# Patient Record
Sex: FEMALE | Race: WHITE | NOT HISPANIC OR LATINO | Employment: FULL TIME | URBAN - METROPOLITAN AREA
[De-identification: names, ages, dates, MRNs, and addresses within clinical notes are randomized per-mention and may not be internally consistent; named-entity substitution may affect disease eponyms.]

---

## 2017-09-05 ENCOUNTER — NON-PROVIDER VISIT (OUTPATIENT)
Dept: URGENT CARE | Facility: PHYSICIAN GROUP | Age: 57
End: 2017-09-05

## 2017-09-05 DIAGNOSIS — Z02.1 PRE-EMPLOYMENT DRUG SCREENING: ICD-10-CM

## 2017-09-05 LAB
AMP AMPHETAMINE: NORMAL
COC COCAINE: NORMAL
INT CON NEG: NEGATIVE
INT CON POS: POSITIVE
MET METHAMPHETAMINES: NORMAL
OPI OPIATES: NORMAL
PCP PHENCYCLIDINE: NORMAL
POC DRUG COMMENT 753798-OCCUPATIONAL HEALTH: NORMAL
THC: NORMAL

## 2017-09-05 PROCEDURE — 80305 DRUG TEST PRSMV DIR OPT OBS: CPT | Performed by: PHYSICIAN ASSISTANT

## 2017-11-03 ENCOUNTER — OFFICE VISIT (OUTPATIENT)
Dept: MEDICAL GROUP | Facility: MEDICAL CENTER | Age: 57
End: 2017-11-03
Payer: COMMERCIAL

## 2017-11-03 ENCOUNTER — HOSPITAL ENCOUNTER (OUTPATIENT)
Dept: RADIOLOGY | Facility: MEDICAL CENTER | Age: 57
End: 2017-11-03
Attending: INTERNAL MEDICINE
Payer: COMMERCIAL

## 2017-11-03 VITALS
SYSTOLIC BLOOD PRESSURE: 140 MMHG | HEART RATE: 76 BPM | OXYGEN SATURATION: 97 % | HEIGHT: 67 IN | WEIGHT: 184 LBS | DIASTOLIC BLOOD PRESSURE: 90 MMHG | BODY MASS INDEX: 28.88 KG/M2 | TEMPERATURE: 98.6 F

## 2017-11-03 DIAGNOSIS — Z12.11 SCREENING FOR MALIGNANT NEOPLASM OF COLON: ICD-10-CM

## 2017-11-03 DIAGNOSIS — Z12.39 SCREENING FOR MALIGNANT NEOPLASM OF BREAST: ICD-10-CM

## 2017-11-03 DIAGNOSIS — M79.642 LEFT HAND PAIN: ICD-10-CM

## 2017-11-03 DIAGNOSIS — Z23 NEED FOR DIPHTHERIA-TETANUS-PERTUSSIS (TDAP) VACCINE: ICD-10-CM

## 2017-11-03 DIAGNOSIS — S69.92XA HAND INJURY, LEFT, INITIAL ENCOUNTER: ICD-10-CM

## 2017-11-03 DIAGNOSIS — E03.9 ACQUIRED HYPOTHYROIDISM: ICD-10-CM

## 2017-11-03 DIAGNOSIS — G43.809 OTHER MIGRAINE WITHOUT STATUS MIGRAINOSUS, NOT INTRACTABLE: ICD-10-CM

## 2017-11-03 DIAGNOSIS — Z76.89 ENCOUNTER TO ESTABLISH CARE: ICD-10-CM

## 2017-11-03 DIAGNOSIS — Z00.00 HEALTH CARE MAINTENANCE: ICD-10-CM

## 2017-11-03 DIAGNOSIS — B02.9 HERPES ZOSTER WITHOUT COMPLICATION: ICD-10-CM

## 2017-11-03 PROBLEM — G43.909 MIGRAINE: Status: ACTIVE | Noted: 2017-11-03

## 2017-11-03 PROCEDURE — 99204 OFFICE O/P NEW MOD 45 MIN: CPT | Mod: 25 | Performed by: INTERNAL MEDICINE

## 2017-11-03 PROCEDURE — 73120 X-RAY EXAM OF HAND: CPT | Mod: LT

## 2017-11-03 PROCEDURE — 90715 TDAP VACCINE 7 YRS/> IM: CPT | Performed by: INTERNAL MEDICINE

## 2017-11-03 PROCEDURE — 90471 IMMUNIZATION ADMIN: CPT | Performed by: INTERNAL MEDICINE

## 2017-11-03 RX ORDER — LEVOTHYROXINE SODIUM 0.1 MG/1
100 TABLET ORAL
Qty: 90 TAB | Refills: 0 | Status: SHIPPED | OUTPATIENT
Start: 2017-11-03 | End: 2018-02-02 | Stop reason: SDUPTHER

## 2017-11-03 RX ORDER — RIZATRIPTAN BENZOATE 10 MG/1
10 TABLET ORAL
Qty: 10 TAB | Refills: 3 | Status: SHIPPED | OUTPATIENT
Start: 2017-11-03 | End: 2018-05-17 | Stop reason: SDUPTHER

## 2017-11-03 RX ORDER — VALACYCLOVIR HYDROCHLORIDE 1 G/1
1000 TABLET, FILM COATED ORAL 2 TIMES DAILY
Qty: 20 TAB | Refills: 0 | Status: SHIPPED | OUTPATIENT
Start: 2017-11-03 | End: 2018-08-15 | Stop reason: SDUPTHER

## 2017-11-03 RX ORDER — LEVOTHYROXINE SODIUM 0.1 MG/1
100 TABLET ORAL
COMMUNITY
End: 2017-11-03 | Stop reason: SDUPTHER

## 2017-11-03 ASSESSMENT — PATIENT HEALTH QUESTIONNAIRE - PHQ9: CLINICAL INTERPRETATION OF PHQ2 SCORE: 0

## 2017-11-03 NOTE — LETTER
[facility]  Elke Banks M.D.  [address]  [place] [address]  Fax: [phone]   Authorization for Release/Disclosure of   Protected Health Information   Name: MYRTLE LIMA : 1960 SSN: [id number]   Address: [address] [place] [address] Phone:    [phone] (home)    I authorize the entity listed below to release/disclose the PHI below to:   [facility]/Elke Banks M.D. and Elke Banks M.D.   Provider or Entity Name:     Address   City, State, Zip   Phone:      Fax:     Reason for request: continuity of care   Information to be released:    [  ] LAST COLONOSCOPY,  including any PATH REPORT and follow-up  [  ] LAST FIT/COLOGUARD RESULT [  ] LAST DEXA  [  ] LAST MAMMOGRAM  [  ] LAST PAP  [  ] LAST LABS [  ] RETINA EXAM REPORT  [  ] IMMUNIZATION RECORDS  [  ] Release all info      [  ] Check here and initial the line next to each item to release ALL health information INCLUDING  _____ Care and treatment for drug and / or alcohol abuse  _____ HIV testing, infection status, or AIDS  _____ Genetic Testing    DATES OF SERVICE OR TIME PERIOD TO BE DISCLOSED: _____________  I understand and acknowledge that:  * This Authorization may be revoked at any time by you in writing, except if your health information has already been used or disclosed.  * Your health information that will be used or disclosed as a result of you signing this authorization could be re-disclosed by the recipient. If this occurs, your re-disclosed health information may no longer be protected by State or Federal laws.  * You may refuse to sign this Authorization. Your refusal will not affect your ability to obtain treatment.  * This Authorization becomes effective upon signing and will  on (date) __________.      If no date is indicated, this Authorization will  one (1) year from the signature date.    Name: Myrtle Lima    Signature:   Date:     11/3/2017            PLEASE FAX REQUESTED RECORDS BACK TO: [phone]

## 2017-11-03 NOTE — PROGRESS NOTES
CHIEF COMPLAINT  Chief Complaint   Patient presents with   • Establish Care     smashed hand, orders for colonoscopy, meds      HPI  Patient is a 57 y.o. female patient who presents today for the following     HYPOTHYROIDISM  Levothyroxine, 100 mcg, taking daily early in am, before any po intake.  No temperature intolerance. No change in weight,  hair/skin quality, BMs.   No tremors, weakness.  No peripheral swelling.  No mood changes.  FH: neg    MIGRAINE  Onset: since early 20'  The patient reports  left temporal headaches, described as: severe in severity , sharp, pounding with nausea, emesis, photophobia or aura, nausea and photophobia, without radiation.  Usual frequency is every 2 weeks for the last 2-3 months  Aura is not present.  Headaches are relieved by rizatriptan  Known triggers include: stress, poor sleep.   Previous treatment and prevention include: as above.    Pertinent negatives:  Denies difficulty with speech or swallowing, facial numbness or tingling, focal weakness. Denies sore throat or cough, fever, sinus congestion, ear pain, tooth clenching or grinding.    Family Hx: no known family members with significant headaches  Prior imaging: no    H/o shingles  She has h/o shingles at least 10 yrs ago.   C/o intermittent flares, with skin burning,    - started few days ago, mild, on the right lateral chest.   She requested Valtrex refill, that use in the past.     Left hand injury/pain  Onset: 10 days ago  Trauma: Heavy metal window dropped on her hand  Complains of:  - Pain in the right hand  - 2 cm palm laceration  - Change of sensation in the last 2 medial fingers  - Pain in the left elbow and shoulder    Denies:  Fever, chills  Numbness, weakness, tingling    Reviewed PMH, PSH, FH, SH, ALL, HCM/IMM  Reviewed MEDS    CURRENT MEDICATIONS  Current Outpatient Prescriptions   Medication Sig Dispense Refill   • levothyroxine (SYNTHROID) 100 MCG Tab Take 100 mcg by mouth Every morning on an empty  "stomach.       No current facility-administered medications for this visit.      ALLERGIES  Allergies: Penicillins  PAST MEDICAL HISTORY  Past Medical History:   Diagnosis Date   • Hypothyroidism      SURGICAL HISTORY  She  has no past surgical history on file.  SOCIAL HISTORY  Social History   Substance Use Topics   • Smoking status: Never Smoker   • Smokeless tobacco: Never Used   • Alcohol use 3.0 oz/week     5 Glasses of wine per week     Social History     Social History Narrative   • No narrative on file     FAMILY HISTORY  History reviewed. No pertinent family history.  No family status information on file.     ROS   Constitutional: Negative for fever, chills.  HENT: Negative for congestion, sore throat.  Eyes: Negative for blurred vision.   Respiratory: Negative for cough, shortness of breath.  Cardiovascular: Negative for chest pain, palpitations.   Gastrointestinal: Negative for heartburn, nausea, abdominal pain.   Genitourinary: Negative for dysuria.  Musculoskeletal: as above  Skin: Negative for rash and itching.   Neuro: Negative for dizziness, weakness and as above.   Endo/Heme/Allergies: Does not bruise/bleed easily. And per HPI.  Psychiatric/Behavioral: Negative for depression, anxiety    PHYSICAL EXAM   Blood pressure 140/90, pulse 76, temperature 37 °C (98.6 °F), height 1.702 m (5' 7\"), weight 83.5 kg (184 lb), SpO2 97 %, not currently breastfeeding. Body mass index is 28.82 kg/m².  General:  NAD, well appearing  HEENT:   NC/AT, PERRLA, EOMI, TMs are clear. Oropharyngeal mucosa is pink,  without lesions;  no cervical / supraclavicular  lymphadenopathy, no thyromegaly.    Cardiovascular: RRR.   No m/r/g. No carotid bruits .      Lungs:   CTAB, no w/r/r, no respiratory distress.  Abdomen: Soft, NT/ND + BS; no suprapubic tenderness; no masses or hepatosplenomegaly.  Extremities:  2+ DP and radial pulses bilaterally.  No c/c/e.   Skin:  Warm, dry.  No erythema. No rash.   Neurologic: Alert & oriented x " 3. CN II-XII grossly intact.   Left hand:   Decreased sensation over the 4th/5th fingers on the left side, 2 cm palmar laceration.  TTP over the hand, no significant swelling.   Psychiatric:  Affect normal, mood normal, judgment normal.    LABS     Penidng    IMAGING     None    ASSESMENT AND PLAN        1. Other migraine without status migrainosus, not intractable  Controlled, continue current treatment, avoids triggers  - rizatriptan (MAXALT) 10 MG tablet; Take 1 Tab by mouth Once PRN for Migraine for up to 1 dose.  Dispense: 10 Tab; Refill: 3    2. Acquired hypothyroidism  Asymptomatic, continue current treatment, pending TSH  - TSH; Future  - levothyroxine (SYNTHROID) 100 MCG Tab; Take 1 Tab by mouth Every morning on an empty stomach.  Dispense: 90 Tab; Refill: 0    3. Herpes zoster without complication  Refill:  - valacyclovir (VALTREX) 1 GM Tab; Take 1 Tab by mouth 2 times a day.  Dispense: 20 Tab; Refill: 0    4. Hand injury, left, initial encounter  - DX-HAND 2- LEFT; Future  5. Left hand pain  - DX-HAND 2- LEFT; Future  Advice:  RICE  Brace/sling    6. Health care maintenance  Pending records    7. Screening for malignant neoplasm of breast  - MA-SCREEN MAMMO W/CAD-BILAT; Future    8. Encounter to establish care  Reviewed PMH, PSH, FH, SH, ALL, MEDS, HCM/IMM.   Advised healthy habits, diet, exercise.  Release form for old records: signed    9. Screening for malignant neoplasm of colon  - REFERRAL TO GI FOR COLONOSCOPY    10. Need for diphtheria-tetanus-pertussis (Tdap) vaccine  - Tdap =>8yo IM  Information was provided to the patient regarding the vaccine, including side effects. Vaccine was given by my medical assistant under my supervision.    Counseling:   - Smoking:  Nonsmoker    Followup: Return in about 6 months (around 5/3/2018) for Short.    All questions are answered.    Time spent 60 minutes face to face, with > 50% spent counseling and coordinating care.  Due to number of problems /  complexity.    Please note that this dictation was created using voice recognition software, and that there might be errors of anahy and possibly content.

## 2017-12-11 ENCOUNTER — HOSPITAL ENCOUNTER (OUTPATIENT)
Dept: RADIOLOGY | Facility: MEDICAL CENTER | Age: 57
End: 2017-12-11
Attending: INTERNAL MEDICINE
Payer: COMMERCIAL

## 2017-12-11 DIAGNOSIS — Z12.39 SCREENING FOR MALIGNANT NEOPLASM OF BREAST: ICD-10-CM

## 2017-12-11 PROCEDURE — G0202 SCR MAMMO BI INCL CAD: HCPCS

## 2018-01-11 ENCOUNTER — HOSPITAL ENCOUNTER (OUTPATIENT)
Dept: RADIOLOGY | Facility: MEDICAL CENTER | Age: 58
End: 2018-01-11

## 2018-02-02 DIAGNOSIS — E03.9 ACQUIRED HYPOTHYROIDISM: ICD-10-CM

## 2018-02-02 RX ORDER — LEVOTHYROXINE SODIUM 0.1 MG/1
100 TABLET ORAL
Qty: 30 TAB | Refills: 0 | Status: SHIPPED | OUTPATIENT
Start: 2018-02-02 | End: 2018-03-11 | Stop reason: SDUPTHER

## 2018-02-02 NOTE — TELEPHONE ENCOUNTER
Was the patient seen in the last year in this department? Yes     Does patient have an active prescription for medications requested? No     Received Request Via: Pharmacy     TSH lab never done

## 2018-03-11 DIAGNOSIS — E03.9 ACQUIRED HYPOTHYROIDISM: ICD-10-CM

## 2018-03-12 RX ORDER — LEVOTHYROXINE SODIUM 0.1 MG/1
100 TABLET ORAL
Qty: 30 TAB | Refills: 0 | Status: SHIPPED | OUTPATIENT
Start: 2018-03-12 | End: 2018-04-15 | Stop reason: SDUPTHER

## 2018-04-15 DIAGNOSIS — E03.9 ACQUIRED HYPOTHYROIDISM: ICD-10-CM

## 2018-04-16 RX ORDER — LEVOTHYROXINE SODIUM 0.1 MG/1
100 TABLET ORAL
Qty: 30 TAB | Refills: 0 | Status: SHIPPED | OUTPATIENT
Start: 2018-04-16 | End: 2018-08-15

## 2018-04-16 NOTE — TELEPHONE ENCOUNTER
Was the patient seen in the last year in this department? Yes     Does patient have an active prescription for medications requested? Yes     Received Request Via: Pharmacy     Patient does not have TSH done.

## 2018-05-17 DIAGNOSIS — G43.809 OTHER MIGRAINE WITHOUT STATUS MIGRAINOSUS, NOT INTRACTABLE: ICD-10-CM

## 2018-05-17 RX ORDER — RIZATRIPTAN BENZOATE 10 MG/1
10 TABLET ORAL
Qty: 10 TAB | Refills: 0 | Status: SHIPPED | OUTPATIENT
Start: 2018-05-17 | End: 2018-08-15

## 2018-05-17 NOTE — TELEPHONE ENCOUNTER
Was the patient seen in the last year in this department? Yes     Does patient have an active prescription for medications requested? No     Received Request Via: Patient       PT  IS REQUESTING WE WILL THE DISSOLVABLE TABS.   PLEASE ADVISE,     THANK YOU

## 2018-06-07 ENCOUNTER — TELEPHONE (OUTPATIENT)
Dept: MEDICAL GROUP | Facility: MEDICAL CENTER | Age: 58
End: 2018-06-07

## 2018-06-07 NOTE — TELEPHONE ENCOUNTER
· Refill Request paperwork received from refill requiring provider signature.     · All appropriate fields completed by Medical Assistant: Yes    · Paperwork responded and sent back to the pharmacy.     Patient was sent [employer] message regarding this medication refill. Patient needs an office visit and labs done before the medication can be refilled. Patient read [employer] message but has not contact our office as of yet.

## 2018-06-11 ENCOUNTER — HOSPITAL ENCOUNTER (OUTPATIENT)
Dept: LAB | Facility: MEDICAL CENTER | Age: 58
End: 2018-06-11
Attending: INTERNAL MEDICINE
Payer: COMMERCIAL

## 2018-06-11 DIAGNOSIS — E03.9 ACQUIRED HYPOTHYROIDISM: ICD-10-CM

## 2018-06-11 LAB — TSH SERPL DL<=0.005 MIU/L-ACNC: 3.49 UIU/ML (ref 0.38–5.33)

## 2018-06-11 PROCEDURE — 84443 ASSAY THYROID STIM HORMONE: CPT

## 2018-06-11 PROCEDURE — 36415 COLL VENOUS BLD VENIPUNCTURE: CPT

## 2018-08-14 RX ORDER — LEVOTHYROXINE SODIUM 0.1 MG/1
TABLET ORAL
COMMUNITY
End: 2018-08-15 | Stop reason: SDUPTHER

## 2018-08-15 ENCOUNTER — OFFICE VISIT (OUTPATIENT)
Dept: MEDICAL GROUP | Facility: MEDICAL CENTER | Age: 58
End: 2018-08-15
Payer: COMMERCIAL

## 2018-08-15 VITALS
TEMPERATURE: 98.8 F | HEART RATE: 78 BPM | BODY MASS INDEX: 30.31 KG/M2 | WEIGHT: 193.12 LBS | SYSTOLIC BLOOD PRESSURE: 110 MMHG | HEIGHT: 67 IN | OXYGEN SATURATION: 96 % | DIASTOLIC BLOOD PRESSURE: 88 MMHG

## 2018-08-15 DIAGNOSIS — Z86.19 HISTORY OF SHINGLES: ICD-10-CM

## 2018-08-15 DIAGNOSIS — M79.642 LEFT HAND PAIN: ICD-10-CM

## 2018-08-15 DIAGNOSIS — Z00.00 HEALTH CARE MAINTENANCE: ICD-10-CM

## 2018-08-15 DIAGNOSIS — E03.9 ACQUIRED HYPOTHYROIDISM: ICD-10-CM

## 2018-08-15 PROCEDURE — 99214 OFFICE O/P EST MOD 30 MIN: CPT | Performed by: INTERNAL MEDICINE

## 2018-08-15 RX ORDER — LEVOTHYROXINE SODIUM 0.1 MG/1
TABLET ORAL
Qty: 90 TAB | Refills: 1 | Status: SHIPPED | OUTPATIENT
Start: 2018-08-15 | End: 2018-12-18 | Stop reason: SDUPTHER

## 2018-08-15 RX ORDER — VALACYCLOVIR HYDROCHLORIDE 1 G/1
1000 TABLET, FILM COATED ORAL 2 TIMES DAILY
Qty: 20 TAB | Refills: 0 | Status: SHIPPED | OUTPATIENT
Start: 2018-08-15

## 2018-08-15 RX ORDER — RIZATRIPTAN BENZOATE 10 MG/1
10 TABLET, ORALLY DISINTEGRATING ORAL
Qty: 10 TAB | Refills: 3 | Status: SHIPPED | OUTPATIENT
Start: 2018-08-15 | End: 2019-03-26 | Stop reason: SDUPTHER

## 2018-08-15 NOTE — PROGRESS NOTES
CHIEF COMPLAINT  Chief Complaint   Patient presents with   • Medication Refill   Migraine    HPI  Patient is a 57 y.o. female patient who presents today for the following     HYPOTHYROIDISM  Levothyroxine, 100 mcg, taking daily early in am, before any po intake.  No temperature intolerance. No change in weight, hair/skin quality, BMs.   No tremors, weakness.  No peripheral swelling.  No mood changes.  FH: neg     Chronic migraine  Stable and controlled.  Onset: since early 20'  The patient reports left temporal headaches, described as: severe in severity , sharp, pounding with nausea, emesis, photophobia or aura, nausea and photophobia, without radiation.  Usual frequency is every 2 weeks for the last 2-3 months  Aura is not present.  Headaches are relieved by rizatriptan, needs prescription for other formulation  Known triggers include: stress, poor sleep.   Previous treatment and prevention include: as above.     Pertinent negatives:  Denies difficulty with speech or swallowing, facial numbness or tingling, focal weakness. Denies sore throat or cough, fever, sinus congestion, ear pain, tooth clenching or grinding.     Family Hx: no known family members with significant headaches  Prior imaging: no     H/o shingles  She has h/o shingles > 10 yrs ago.   C/o intermittent flares, with skin burning,               - started few days ago, mild, on the right lateral chest.   She requested Valtrex refill, that use in the past.      Left hand pain  St post trauma in 11/18 she is 22 she   -heavy metal window dropped on her hand  Complains of continuing, but, improving   -  discomfort in the left hand/palm.    -  change of sensation in the last 2 medial fingers  Denies:  Fever, chills  Numbness, weakness, tingling    Reviewed PMH, PSH, FH, SH, ALL, HCM/IMM, no changes  Reviewed MEDS, no changes    Patient Active Problem List    Diagnosis Date Noted   • Acquired hypothyroidism 11/03/2017   • Chronic migraine 11/03/2017   •  Health care maintenance 2017     CURRENT MEDICATIONS  Current Outpatient Prescriptions   Medication Sig Dispense Refill   • levothyroxine (SYNTHROID) 100 MCG Tab Take 1 tablet every day by oral route. 90 Tab 1   • rizatriptan (MAXALT-MLT) 10 MG disintegrating tablet Take 1 Tab by mouth Once PRN for Migraine for up to 1 dose. 10 Tab 3   • rizatriptan (MAXALT) 10 MG tablet Take 1 Tab by mouth Once PRN for Migraine for up to 1 dose. 10 Tab 0   • valacyclovir (VALTREX) 1 GM Tab Take 1 Tab by mouth 2 times a day. 20 Tab 0     No current facility-administered medications for this visit.      ALLERGIES  Allergies: Penicillins  PAST MEDICAL HISTORY  Past Medical History:   Diagnosis Date   • Hypothyroidism    • Migraine      SURGICAL HISTORY  She  has a past surgical history that includes tonsillectomy (); appendectomy (); other orthopedic surgery (); shoulder arthroscopy (Right, ); cholecystectomy (); and endometrial ablation ().  SOCIAL HISTORY  Social History   Substance Use Topics   • Smoking status: Never Smoker   • Smokeless tobacco: Never Used   • Alcohol use 3.0 oz/week     5 Glasses of wine per week     Social History     Social History Narrative   • No narrative on file     FAMILY HISTORY  Family History   Problem Relation Age of Onset   • Cancer Mother 65        ovarian   • Psychiatry Mother         bipolar   • Heart Attack Father         MI,    • Allergies Father    • Heart Disease Father    • No Known Problems Brother    • Heart Disease Paternal Grandfather      Family Status   Relation Status   • Mo    • Fa (Not Specified)   • Bro (Not Specified)   • PGFa (Not Specified)     ROS   Constitutional: Negative for fever, chills.  HENT: Negative for congestion, sore throat.  Eyes: Negative for blurred vision.   Respiratory: Negative for cough, shortness of breath.  Cardiovascular: Negative for chest pain, palpitations.   Gastrointestinal: Negative for heartburn, nausea,  "abdominal pain.   Genitourinary: Negative for dysuria.  Musculoskeletal: Negative for back pain pain.   Skin: Negative for rash and itching.   Neuro: Negative for dizziness, weakness and headaches.   Endo/Heme/Allergies: Does not bruise/bleed easily. And per HPI.  Psychiatric/Behavioral: Negative for depression, anxiety    PHYSICAL EXAM   Blood pressure 110/88, pulse 78, temperature 37.1 °C (98.8 °F), height 1.702 m (5' 7\"), weight 87.6 kg (193 lb 2 oz), SpO2 96 %. Body mass index is 30.25 kg/m².  General:  NAD, well appearing  HEENT:   NC/AT, PERRLA, EOMI, TMs are clear. Oropharyngeal mucosa is pink,  without lesions;  no cervical / supraclavicular  lymphadenopathy, no thyromegaly.    Cardiovascular: RRR.   No m/r/g. No carotid bruits .      Lungs:   CTAB, no w/r/r, no respiratory distress.  Abdomen: Soft, NT/ND + BS; unable to palpate liver/spleen due to obesity   Extremities:  2+ DP and radial pulses bilaterally.  No c/c/e.   No TTP, redness or swelling of the left hand.  Skin:  Warm, dry.  No erythema. No rash.   Neurologic: Alert & oriented x 3.  No focal deficits.  Psychiatric:  Affect normal, mood normal, judgment normal.    LABS     Labs are reviewed and discussed with a patient     Ref. Range 6/11/2018    TSH  0.380 - 5.330 uIU/mL 3.490     IMAGING     None    ASSESMENT AND PLAN        1. Acquired hypothyroidism  Asymptomatic, continue current dose of levothyroxine  - TSH; Future  - FREE THYROXINE; Future    2. Chronic migraine  Controlled, continue current treatment, given different formulation  - rizatriptan (MAXALT-MLT) 10 MG disintegrating tablet; Take 1 Tab by mouth Once PRN for Migraine for up to 1 dose.  Dispense: 10 Tab; Refill: 3    3. History of shingles  Refill:  - valacyclovir (VALTREX) 1 GM Tab; Take 1 Tab by mouth 2 times a day.  Dispense: 20 Tab; Refill: 0    4. Left hand pain  Advised massage and stretching    5. BMI 30.0-30.9,adult  - Patient identified as having weight management issue.  " Appropriate orders and counseling given.    6. Health care maintenance  She will schedule Pap smear in 12/2018    Counseling:   - Smoking:  Nonsmoker    Followup: Return in about 3 months (around 11/15/2018).    All questions are answered.    Please note that this dictation was created using voice recognition software, and that there might be errors of anahy and possibly content.

## 2018-12-18 ENCOUNTER — OFFICE VISIT (OUTPATIENT)
Dept: MEDICAL GROUP | Facility: MEDICAL CENTER | Age: 58
End: 2018-12-18
Payer: COMMERCIAL

## 2018-12-18 ENCOUNTER — HOSPITAL ENCOUNTER (OUTPATIENT)
Facility: MEDICAL CENTER | Age: 58
End: 2018-12-18
Attending: INTERNAL MEDICINE
Payer: COMMERCIAL

## 2018-12-18 ENCOUNTER — HOSPITAL ENCOUNTER (OUTPATIENT)
Dept: LAB | Facility: MEDICAL CENTER | Age: 58
End: 2018-12-18
Attending: INTERNAL MEDICINE
Payer: COMMERCIAL

## 2018-12-18 VITALS
OXYGEN SATURATION: 97 % | DIASTOLIC BLOOD PRESSURE: 82 MMHG | WEIGHT: 220.02 LBS | TEMPERATURE: 98 F | BODY MASS INDEX: 34.53 KG/M2 | RESPIRATION RATE: 14 BRPM | HEIGHT: 67 IN | SYSTOLIC BLOOD PRESSURE: 142 MMHG | HEART RATE: 81 BPM

## 2018-12-18 DIAGNOSIS — Z00.00 HEALTH CARE MAINTENANCE: ICD-10-CM

## 2018-12-18 DIAGNOSIS — Z01.419 WELL FEMALE EXAM WITH ROUTINE GYNECOLOGICAL EXAM: ICD-10-CM

## 2018-12-18 DIAGNOSIS — E03.9 ACQUIRED HYPOTHYROIDISM: ICD-10-CM

## 2018-12-18 DIAGNOSIS — Z86.19 HISTORY OF SHINGLES: ICD-10-CM

## 2018-12-18 DIAGNOSIS — K63.5 POLYP OF COLON, UNSPECIFIED PART OF COLON, UNSPECIFIED TYPE: ICD-10-CM

## 2018-12-18 DIAGNOSIS — Z12.39 SCREENING FOR MALIGNANT NEOPLASM OF BREAST: ICD-10-CM

## 2018-12-18 DIAGNOSIS — Z23 NEED FOR VACCINATION: ICD-10-CM

## 2018-12-18 DIAGNOSIS — Z12.4 SCREENING FOR MALIGNANT NEOPLASM OF CERVIX: ICD-10-CM

## 2018-12-18 DIAGNOSIS — R93.3 ABNORMAL COLONOSCOPY: ICD-10-CM

## 2018-12-18 LAB
T4 FREE SERPL-MCNC: 1.02 NG/DL (ref 0.53–1.43)
TSH SERPL DL<=0.005 MIU/L-ACNC: 2.84 UIU/ML (ref 0.38–5.33)

## 2018-12-18 PROCEDURE — 99000 SPECIMEN HANDLING OFFICE-LAB: CPT | Performed by: INTERNAL MEDICINE

## 2018-12-18 PROCEDURE — 88175 CYTOPATH C/V AUTO FLUID REDO: CPT

## 2018-12-18 PROCEDURE — 99214 OFFICE O/P EST MOD 30 MIN: CPT | Mod: 25 | Performed by: INTERNAL MEDICINE

## 2018-12-18 PROCEDURE — 36415 COLL VENOUS BLD VENIPUNCTURE: CPT

## 2018-12-18 PROCEDURE — 87624 HPV HI-RISK TYP POOLED RSLT: CPT

## 2018-12-18 PROCEDURE — 84443 ASSAY THYROID STIM HORMONE: CPT

## 2018-12-18 PROCEDURE — 99396 PREV VISIT EST AGE 40-64: CPT | Performed by: INTERNAL MEDICINE

## 2018-12-18 PROCEDURE — 84439 ASSAY OF FREE THYROXINE: CPT

## 2018-12-18 RX ORDER — LEVOTHYROXINE SODIUM 0.1 MG/1
TABLET ORAL
Qty: 90 TAB | Refills: 3 | Status: SHIPPED | OUTPATIENT
Start: 2018-12-18 | End: 2020-02-21

## 2018-12-18 ASSESSMENT — PATIENT HEALTH QUESTIONNAIRE - PHQ9: CLINICAL INTERPRETATION OF PHQ2 SCORE: 0

## 2018-12-19 DIAGNOSIS — Z01.419 WELL FEMALE EXAM WITH ROUTINE GYNECOLOGICAL EXAM: ICD-10-CM

## 2018-12-19 DIAGNOSIS — Z12.4 SCREENING FOR MALIGNANT NEOPLASM OF CERVIX: ICD-10-CM

## 2018-12-20 LAB
CYTOLOGY REG CYTOL: NORMAL
HPV HR 12 DNA CVX QL NAA+PROBE: NEGATIVE
HPV16 DNA SPEC QL NAA+PROBE: NEGATIVE
HPV18 DNA SPEC QL NAA+PROBE: NEGATIVE
SPECIMEN SOURCE: NORMAL

## 2018-12-28 ENCOUNTER — HOSPITAL ENCOUNTER (OUTPATIENT)
Dept: RADIOLOGY | Facility: MEDICAL CENTER | Age: 58
End: 2018-12-28
Attending: INTERNAL MEDICINE
Payer: COMMERCIAL

## 2018-12-28 DIAGNOSIS — Z12.39 SCREENING FOR MALIGNANT NEOPLASM OF BREAST: ICD-10-CM

## 2018-12-28 PROCEDURE — 77067 SCR MAMMO BI INCL CAD: CPT

## 2019-03-26 ENCOUNTER — OFFICE VISIT (OUTPATIENT)
Dept: MEDICAL GROUP | Facility: MEDICAL CENTER | Age: 59
End: 2019-03-26
Payer: COMMERCIAL

## 2019-03-26 VITALS
WEIGHT: 193.78 LBS | HEART RATE: 80 BPM | DIASTOLIC BLOOD PRESSURE: 82 MMHG | SYSTOLIC BLOOD PRESSURE: 122 MMHG | HEIGHT: 67 IN | OXYGEN SATURATION: 96 % | TEMPERATURE: 98.6 F | BODY MASS INDEX: 30.42 KG/M2

## 2019-03-26 DIAGNOSIS — E03.9 ACQUIRED HYPOTHYROIDISM: ICD-10-CM

## 2019-03-26 DIAGNOSIS — Z56.6 STRESS AT WORK: ICD-10-CM

## 2019-03-26 DIAGNOSIS — E66.9 OBESITY (BMI 30.0-34.9): ICD-10-CM

## 2019-03-26 DIAGNOSIS — M62.838 MUSCLE SPASM: ICD-10-CM

## 2019-03-26 DIAGNOSIS — Z00.00 HEALTH CARE MAINTENANCE: ICD-10-CM

## 2019-03-26 PROCEDURE — 99214 OFFICE O/P EST MOD 30 MIN: CPT | Performed by: INTERNAL MEDICINE

## 2019-03-26 RX ORDER — CYCLOBENZAPRINE HCL 5 MG
5-10 TABLET ORAL 3 TIMES DAILY PRN
Qty: 40 TAB | Refills: 2 | Status: SHIPPED | OUTPATIENT
Start: 2019-03-26

## 2019-03-26 RX ORDER — RIZATRIPTAN BENZOATE 10 MG/1
10 TABLET, ORALLY DISINTEGRATING ORAL
Qty: 10 TAB | Refills: 3 | Status: SHIPPED | OUTPATIENT
Start: 2019-03-26 | End: 2019-07-27 | Stop reason: SDUPTHER

## 2019-03-26 SDOH — HEALTH STABILITY - MENTAL HEALTH: OTHER PHYSICAL AND MENTAL STRAIN RELATED TO WORK: Z56.6

## 2019-03-26 ASSESSMENT — PATIENT HEALTH QUESTIONNAIRE - PHQ9: CLINICAL INTERPRETATION OF PHQ2 SCORE: 0

## 2019-03-26 NOTE — PROGRESS NOTES
CHIEF COMPLAINT  Chief Complaint   Patient presents with   • Migraine   • Medication Refill     Levothyroxine     HPI  Patient is a 58 y.o. female patient who presents today for the following     Hypothyroidism  Levothyroxine, 100 mcg, taking daily early in am, before any po intake.  No temperature intolerance. No change in weight, hair/skin quality, BMs.   No tremors, weakness.  No peripheral swelling.  No mood changes.  FH: neg     Chronic migraine, stress, muscle spasm  Interval course:  - more frequent migraines due to stress and muscle spasm of neck/shoulders    Stable and controlled.  Onset: since early 20'  The patient reports left temporal headaches, described as: severe in severity , sharp, pounding with nausea, emesis, photophobia or aura, nausea and photophobia, without radiation.  Usual frequency is every 2 weeks for the last 2-3 months  Aura is not present.  Headaches are relieved by rizatriptan, needs prescription for other formulation  Known triggers include: stress, poor sleep.   Previous treatment and prevention include: as above.     Pertinent negatives:  Denies difficulty with speech or swallowing, facial numbness or tingling, focal weakness. Denies sore throat or cough, fever, sinus congestion, ear pain, tooth clenching or grinding.     Family Hx: no known family members with significant headaches  Prior imaging: no     H/o shingles  She has h/o shingles > 10 yrs ago.   C/o intermittent flares, with skin burning,   - started few days ago, mild, on the right lateral chest.   She requested Valtrex refill, that use in the past.    Reviewed PMH, PSH, FH, SH, ALL, HCM/IMM, no changes  Reviewed MEDS, no changes    Patient Active Problem List    Diagnosis Date Noted   • History of shingles 08/15/2018   • Acquired hypothyroidism 11/03/2017   • Chronic migraine 11/03/2017   • Health care maintenance 11/03/2017     CURRENT MEDICATIONS  Current Outpatient Prescriptions   Medication Sig Dispense Refill   •  levothyroxine (SYNTHROID) 100 MCG Tab Take 1 tablet every day by oral route. 90 Tab 3   • rizatriptan (MAXALT-MLT) 10 MG disintegrating tablet Take 1 Tab by mouth Once PRN for Migraine for up to 1 dose. 10 Tab 3   • valacyclovir (VALTREX) 1 GM Tab Take 1 Tab by mouth 2 times a day. 20 Tab 0     No current facility-administered medications for this visit.      ALLERGIES  Allergies: Penicillins  PAST MEDICAL HISTORY  Past Medical History:   Diagnosis Date   • Hypothyroidism    • Migraine      SURGICAL HISTORY  She  has a past surgical history that includes tonsillectomy (); appendectomy (); other orthopedic surgery (); shoulder arthroscopy (Right, ); cholecystectomy (); and endometrial ablation ().  SOCIAL HISTORY  Social History   Substance Use Topics   • Smoking status: Never Smoker   • Smokeless tobacco: Never Used   • Alcohol use 3.0 oz/week     5 Glasses of wine per week     Social History     Social History Narrative   • No narrative on file     FAMILY HISTORY  Family History   Problem Relation Age of Onset   • Cancer Mother 65        ovarian   • Psychiatry Mother         bipolar   • Heart Attack Father         MI,    • Allergies Father    • Heart Disease Father    • No Known Problems Brother    • Heart Disease Paternal Grandfather      Family Status   Relation Status   • Mo    • Fa (Not Specified)   • Bro (Not Specified)   • PGFa (Not Specified)     ROS   Constitutional: Negative for fatigue.  HENT: Negative for congestion, sore throat.  Eyes: Negative for blurred vision.   Respiratory: Negative for cough, shortness of breath.  Cardiovascular: Negative for chest pain, palpitations.   Gastrointestinal: Negative for heartburn,  abdominal pain.   Genitourinary: Negative for dysuria.  Musculoskeletal: as above.  Skin: Negative for rash and itching.   Neuro: Negative for dizziness, weakness and headaches.   Endo/Heme/Allergies: Does not bruise/bleed easily. And per  "HPI.  Psychiatric/Behavioral: Negative for depression.    PHYSICAL EXAM   Blood pressure 122/82, pulse 80, temperature 37 °C (98.6 °F), temperature source Temporal, height 1.702 m (5' 7.01\"), weight 87.9 kg (193 lb 12.6 oz), SpO2 96 %, not currently breastfeeding. Body mass index is 30.34 kg/m².  General:  NAD, well appearing  HEENT:   NC/AT, PERRLA, EOMI, TMs are clear. Oropharyngeal mucosa is pink,  without lesions;  no cervical / supraclavicular  lymphadenopathy, no thyromegaly.    Cardiovascular: RRR.   No m/r/g. No carotid bruits .      Lungs:   CTAB, no w/r/r, no respiratory distress.  Abdomen: Soft, NT/ND + BS; no suprapubic tenderness; no masses or hepatosplenomegaly.  Extremities:  2+ DP and radial pulses bilaterally.  No c/c/e.   Spasm ove neck and bilateral shoulder muscles, spinal mm.  Skin:  Warm, dry.  No erythema. No rash.   Neurologic: Alert & oriented x 3. CN II-XII grossly intact.  No focal deficits.  Psychiatric:  Affect normal, mood normal, judgment normal.    LABS     Labs are reviewed and discussed with a patient     Ref. Range 12/18/2018    TSH  0.380 - 5.330 uIU/mL 2.840   Free T-4 0.53 - 1.43 ng/dL 1.02     IMAGING     None    ASSESMENT AND PLAN        1. Acquired hypothyroidism  Controlled, continue current treatment  - TSH; Future  - FREE THYROXINE; Future    2. Chronic migraine  Physical therapy, muscle relaxant may help.    - rizatriptan (MAXALT-MLT) 10 MG disintegrating tablet; Take 1 Tab by mouth Once PRN for Migraine for up to 1 dose.  Dispense: 10 Tab; Refill: 3    3. Stress at work  - REFERRAL TO PHYSICAL THERAPY Reason for Therapy: Eval/Treat/Report  4. Muscle spasm  - REFERRAL TO PHYSICAL THERAPY Reason for Therapy: Eval/Treat/Report  - cyclobenzaprine (FLEXERIL) 5 MG tablet; Take 1-2 Tabs by mouth 3 times a day as needed.  Dispense: 40 Tab; Refill: 2  Advised to continue activity as tolerated.  Massage may help.    5. Obesity (BMI 30.0-34.9)  - OBESITY COUNSELING (No Charge): " Patient identified as having weight management issue.  Appropriate orders and counseling given.    6. Health care maintenance  UTD    Counseling:   - Smoking:  Nonsmoker    Followup: Return in about 6 months (around 9/26/2019), or if symptoms worsen or fail to improve.    All questions are answered.    Please note that this dictation was created using voice recognition software, and that there might be errors of anahy and possibly content.

## 2019-05-15 ENCOUNTER — PHYSICAL THERAPY (OUTPATIENT)
Dept: PHYSICAL THERAPY | Facility: REHABILITATION | Age: 59
End: 2019-05-15
Attending: INTERNAL MEDICINE
Payer: COMMERCIAL

## 2019-05-15 DIAGNOSIS — M62.838 MUSCLE SPASM: ICD-10-CM

## 2019-05-15 PROCEDURE — 97110 THERAPEUTIC EXERCISES: CPT

## 2019-05-15 PROCEDURE — 97162 PT EVAL MOD COMPLEX 30 MIN: CPT

## 2019-05-15 ASSESSMENT — ENCOUNTER SYMPTOMS
PAIN SCALE AT HIGHEST: 5
ALLEVIATING FACTORS: REST
PAIN SCALE AT LOWEST: 5
QUALITY: NUMBNESS
ALLEVIATING FACTORS: PAIN MEDICATION
EXACERBATED BY: ACTIVITY
PAIN SCALE: 5
PAIN TIMING: CONSTANT
QUALITY: ACHING

## 2019-05-15 NOTE — OP THERAPY EVALUATION
Outpatient Physical Therapy  INITIAL EVALUATION    [facility]  [address], [address]  [place] [address]  Phone:  [phone]    Date of Evaluation: 05/15/2019    Patient: Myrtle Lima  YOB: 1960  MRN: [id number]     Referring Provider: Elke Banks M.D.  [address]  [address]  [place] [address]   Referring Diagnosis Other physical and mental strain related to work [Z56.6];Other muscle spasm [M62.838]     Time Calculation  Start time: 1550  Stop time: 1650 Time Calculation (min): 60 minutes     Physical Therapy Occurrence Codes    Date of onset of impairment:  3/26/19   Date physical therapy care plan established or reviewed:  5/15/19   Date physical therapy treatment started:  5/15/19          Chief Complaint: Neck Problem    Visit Diagnoses     ICD-10-CM   1. Muscle spasm M62.838         Subjective:   History of Present Illness:     Date of onset:  3/26/2019    Mechanism of injury:  Pt has about 4-5 migraines per week; she is taking Rizatriptan which helps a lot. 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 She has some radiating symptoms into her L elbow; her hand sometimes falls asleep at night when using her iPad in bed at night. Migraine symptoms start above L upper teeth and L eye socket, then travel overhead and around back of head; accompanied by nausea and photosensitivity.    She has gained 45 pounds over last year due to life stress; she moved to [place] in September 2017. She has been told she has significant tension in her shoulder muscles. She carries stress in her neck and shoulders. She feels disappointed that she has to come to PT. She lives in a 5th wheel currently, and enjoys being outdoors. She also state she is menopausal and feels that might also be contributing. She walks regularly, but is not currently doing other exercises.    Hx: R shoulder surgery many years ago after horse riding  accident.  Pain:     Current pain ratin    At best pain ratin    At worst pain ratin    Location:  Bilateral upper traps, neck, head.     Quality:  Numbness and aching    Pain timing:  Constant    Relieving factors:  Rest and pain medication    Aggravating factors:  Activity (Work stress.)    Progression:  Worsening  Diagnostic Tests:     None    Treatments:     Previous treatment:  Medication  Patient Goals:     Patient goals for therapy:  Decreased pain and increased strength    Other patient goals:  Decrease neck tension and headache. Improve overall strength and conditioning.      Past Medical History:   Diagnosis Date   • Hypothyroidism    • Migraine      Past Surgical History:   Procedure Laterality Date   • ENDOMETRIAL ABLATION  2012   • CHOLECYSTECTOMY  2009   • SHOULDER ARTHROSCOPY Right 2005   • OTHER ORTHOPEDIC SURGERY      R knee   • APPENDECTOMY     • TONSILLECTOMY       Social History   Substance Use Topics   • Smoking status: Never Smoker   • Smokeless tobacco: Never Used   • Alcohol use 3.0 oz/week     5 Glasses of wine per week     Family and Occupational History     Social History   • Marital status: Single     Spouse name: N/A   • Number of children: N/A   • Years of education: N/A       Objective     Postural Observations  Seated posture: fair  Standing posture: good    Additional Postural Observation Details  Decreased TS kyphosis.  Normal scapular position.  Slight anterior pelvic tilt and hip flexion.    * Decreased L medial scap border pain with L CS rotn in standing.    Shoulder Screen    Shoulder range of motion within functional limits with the following exceptions: R flexion limited vs L; pt reported more tightness with R flexion.    Supine Shoulder PROM:  ER: L 95.  R 90.  IR: L 60.  R 45.    Active Range of Motion     Cervical Spine   Flexion: Active cervical flexion: 45.  Extension: Active cervical extension: 35, feels like a good stretch.  Left rotation:  decreased (65, +ROS L CS.)  Right rotation: Active right cervical rotation: -ROS, 60 deg.    Passive Range of Motion     Additional Passive Range of Motion Details  C1-2 Rotation in FF:  L WNL.  R hypomobile.    Joint Play   Spine     Additional joint play details:   Lower CS hypomobile with upglides.  Normal mobility mid cs.  OA hypomobile bilaterally.            Therapeutic Exercises (CPT 58646):     1. Diaphr breathing, HEP    2. SKTC, 2x30 sec ea, HEP    3. Piriformis stretch, 2x30 sec ea, HEP    4. Pillow pec stretch, scissors, HEP    5. TS TB rolling on wall, HEP    Therapeutic Treatments and Modalities:     1. Manual Therapy (CPT 70223), 1. STM subocc. Gentle upper CS stretch (capital flexion)., 2. AA rotn in CS flexion to end range., // Decr L scapular pain with L rotn.    Time-based treatments/modalities:  Manual therapy minutes (CPT 34029): 5 minutes  Therapeutic exercise minutes (CPT 92657): 20 minutes       Assessment, Response and Plan:   Impairments: abnormal ADL function, abnormal muscle tone, abnormal or restricted ROM, impaired functional mobility, lacks appropriate home exercise program and pain with function    Assessment details:  The patient is a 58 y.o. female with c/c of migraine headaches, shoulder girdle muscle pain and tightness, and parasthesias in her elbow and hand L>R. Signs/symptoms consistent with myofascial restrictions along bilateral levator scapulae, upper trapezius, and suboccipital muscles, and L mid-cervical facet joint irritation. The patient reports significant work stress and 45-pound weight gain over the last year; she feels these are also significant contributing factors to her symptoms. She will benefit from skilled PT to address the above impairments, provide education on a self-managed HEP, and facilitate a safe return to PLOF with minimal to no pain.    Barriers to therapy:  None  Prognosis: fair    Goals:   Short Term Goals:   1. Independent with HEP.  2. Able to use  iPad for at least 30 minutes, with modifications to improve ergonomics and reduce neck/UE strain, without aggravation of or limitation by arm/hand symptoms.  3. Able to sleep through the night without interruption by neck pain.  Short term goal time span:  2-4 weeks      Long Term Goals:    1. Independent with HEP and progressions/regressions of exercises and activities as necessary.  2. Able to turn head fully, at least 80 deg to left and right without aggravation of or limitation by neck pain.  3. Able to carry at least 2 bags of groceries, approximately 20 pounds, without aggravation or limitation by neck or shoulder pain.  Long term goal time span:  2-4 months    Plan:   Therapy options:  Physical therapy treatment to continue  Planned therapy interventions:  E Stim Unattended (CPT 30799), Functional Training, Self Care (CPT 36431), Gait Training (CPT 99408), Hot or Cold Pack Therapy (CPT 09403), Manual Therapy (CPT 22438), Neuromuscular Re-education (CPT 63106), Self Care ADL Training (CPT 51827), Therapeutic Activities (CPT 31515), Therapeutic Exercise (CPT 53191) and TENS Applicaton (CPT 13244)  Frequency:  2x week (1-2x/wk as needed, tapering to 1x/2wks as indicated by progress.)  Duration in weeks:  8  Duration in visits:  8  Discussed with:  Patient      Functional Limitations and Severity Modifiers  Neck Disability Total: 19  Quickdash General Total Score: 20.45     Referring provider co-signature:  I have reviewed this plan of care and my co-signature certifies the need for services.  Certification Dates:   From 05/15/2019     To 07/10/2019    Physician Signature: ________________________________ Date: ______________

## 2019-05-22 ENCOUNTER — APPOINTMENT (OUTPATIENT)
Dept: PHYSICAL THERAPY | Facility: REHABILITATION | Age: 59
End: 2019-05-22
Attending: INTERNAL MEDICINE
Payer: COMMERCIAL

## 2019-05-29 ENCOUNTER — PHYSICAL THERAPY (OUTPATIENT)
Dept: PHYSICAL THERAPY | Facility: REHABILITATION | Age: 59
End: 2019-05-29
Attending: INTERNAL MEDICINE
Payer: COMMERCIAL

## 2019-06-05 ENCOUNTER — APPOINTMENT (OUTPATIENT)
Dept: PHYSICAL THERAPY | Facility: REHABILITATION | Age: 59
End: 2019-06-05
Attending: INTERNAL MEDICINE
Payer: COMMERCIAL

## 2019-07-09 ENCOUNTER — OFFICE VISIT (OUTPATIENT)
Dept: MEDICAL GROUP | Facility: MEDICAL CENTER | Age: 59
End: 2019-07-09
Payer: COMMERCIAL

## 2019-07-09 VITALS
BODY MASS INDEX: 30.35 KG/M2 | SYSTOLIC BLOOD PRESSURE: 112 MMHG | TEMPERATURE: 98.8 F | HEART RATE: 86 BPM | OXYGEN SATURATION: 96 % | HEIGHT: 67 IN | WEIGHT: 193.34 LBS | DIASTOLIC BLOOD PRESSURE: 72 MMHG

## 2019-07-09 DIAGNOSIS — Z85.43 H/O OVARIAN CANCER: ICD-10-CM

## 2019-07-09 DIAGNOSIS — R10.32 LLQ PAIN: ICD-10-CM

## 2019-07-09 DIAGNOSIS — Z12.11 COLON CANCER SCREENING: ICD-10-CM

## 2019-07-09 DIAGNOSIS — Z00.00 HEALTH CARE MAINTENANCE: ICD-10-CM

## 2019-07-09 DIAGNOSIS — K63.5 POLYP OF COLON, UNSPECIFIED PART OF COLON, UNSPECIFIED TYPE: ICD-10-CM

## 2019-07-09 PROCEDURE — 99214 OFFICE O/P EST MOD 30 MIN: CPT | Performed by: INTERNAL MEDICINE

## 2019-07-09 NOTE — PROGRESS NOTES
CHIEF COMPLAINT  Chief Complaint   Patient presents with   • Other     Pain in Left Ovary     HPI  Patient is a 58 y.o. female patient who presents today for the following     LLQ pain, FH of ovarian cancer in mother  - Onset: 2 weeks ago  - located in: LLQ / ovary ?  - intensity:  5/10  - quality:  Sharp, lasting ~ 30 min, intermittent  - radiation:  no  - alleviating factors are:  none  -  exacerbating factors are:  none  - accompanied:   - neg for BM change, change in urinary habits, fever, chills  - course: persistent, no change  - imaging: pending  - treatment: none    FH of ovarian cancer: dg at 67 y/o, advanced, passed away    Colon polyps  She has history of colon polyps, due colonoscopy.  Denies change in BM habits: Stool caliber, blood in the stool.  Also denies anorexia or weight loss, bloating.    Reviewed PMH, PSH, FH, SH, ALL, HCM/IMM, no changes  Reviewed MEDS, no changes    Patient Active Problem List    Diagnosis Date Noted   • History of shingles 08/15/2018   • Acquired hypothyroidism 11/03/2017   • Chronic migraine 11/03/2017   • Health care maintenance 11/03/2017     CURRENT MEDICATIONS  Current Outpatient Prescriptions   Medication Sig Dispense Refill   • rizatriptan (MAXALT-MLT) 10 MG disintegrating tablet Take 1 Tab by mouth Once PRN for Migraine for up to 1 dose. 10 Tab 3   • cyclobenzaprine (FLEXERIL) 5 MG tablet Take 1-2 Tabs by mouth 3 times a day as needed. 40 Tab 2   • levothyroxine (SYNTHROID) 100 MCG Tab Take 1 tablet every day by oral route. 90 Tab 3   • valacyclovir (VALTREX) 1 GM Tab Take 1 Tab by mouth 2 times a day. 20 Tab 0     No current facility-administered medications for this visit.      ALLERGIES  Allergies: Penicillins  PAST MEDICAL HISTORY  Past Medical History:   Diagnosis Date   • Hypothyroidism    • Migraine      SURGICAL HISTORY  She  has a past surgical history that includes tonsillectomy (1960); appendectomy (1969); other orthopedic surgery (1981); shoulder  "arthroscopy (Right, 2005); cholecystectomy (); and endometrial ablation ().  SOCIAL HISTORY  Social History   Substance Use Topics   • Smoking status: Never Smoker   • Smokeless tobacco: Never Used   • Alcohol use 3.0 oz/week     5 Glasses of wine per week     Social History     Social History Narrative   • No narrative on file     FAMILY HISTORY  Family History   Problem Relation Age of Onset   • Cancer Mother 65        ovarian   • Psychiatry Mother         bipolar   • Heart Attack Father         MI,    • Allergies Father    • Heart Disease Father    • No Known Problems Brother    • Heart Disease Paternal Grandfather      Family Status   Relation Status   • Mo    • Fa (Not Specified)   • Bro (Not Specified)   • PGFa (Not Specified)     ROS   Constitutional: Negative for fever, chills.  HENT: Negative for congestion, sore throat.  Eyes: Negative for blurred vision.   Respiratory: Negative for cough, shortness of breath.  Cardiovascular: Negative for chest pain, palpitations.   Gastrointestinal: as above.   Genitourinary: as above.  Musculoskeletal: Negative for back pain.  Skin: Negative for rash.   Neuro: Negative for dizziness, headaches.   Endo/Heme/Allergies: Does not bruise/bleed easily.   Psychiatric/Behavioral: Negative for depression.    PHYSICAL EXAM   /72 (BP Location: Left arm, Patient Position: Sitting, BP Cuff Size: Adult)   Pulse 86   Temp 37.1 °C (98.8 °F) (Temporal)   Ht 1.702 m (5' 7\")   Wt 87.7 kg (193 lb 5.5 oz)   SpO2 96%  Body mass index is 30.28 kg/m².  General:  NAD, well appearing  HEENT:   NC/AT, PERRLA, EOMI, TMs are clear. Oropharyngeal mucosa is pink,  without lesions;  no cervical / supraclavicular  lymphadenopathy, no thyromegaly.    Cardiovascular: RRR.   No m/r/g. No carotid bruits .      Lungs:   CTAB, no w/r/r, no respiratory distress.  Abdomen: Soft, ND + BS; no hepatosplenomegaly.  Significant TTP over the LLQ/left ovary  Extremities:  2+ DP and radial " pulses bilaterally.  No c/c/e.   Skin:  Warm, dry.  No erythema. No rash.   Neurologic: Alert & oriented x 3. CN II-XII grossly intact. No focal deficits.  Psychiatric:  Affect normal, mood normal, judgment normal.    LABS     None    IMAGING     None    ASSESMENT AND PLAN        1. LLQ pain  Pt concerned for ovarian Ca - r/o  - CANCER ANTIGEN 125; Future  - REFERRAL TO GASTROENTEROLOGY  - US-PELVIC COMPLETE (TRANSABDOMINAL/TRANSVAGINAL) (COMBO); Future    2. H/O ovarian cancer  As above  - CANCER ANTIGEN 125; Future  - US-PELVIC COMPLETE (TRANSABDOMINAL/TRANSVAGINAL) (COMBO); Future    3. Polyp of colon, unspecified part of colon, unspecified type  - REFERRAL TO GASTROENTEROLOGY  4. Colon cancer screening  - REFERRAL TO GASTROENTEROLOGY  5. Health care maintenance  As above    Counseling:   - Smoking:  Nonsmoker    Followup: Return if symptoms worsen or fail to improve.    All questions are answered.    Please note that this dictation was created using voice recognition software, and that there might be errors of anahy and possibly content.

## 2019-07-25 ENCOUNTER — HOSPITAL ENCOUNTER (OUTPATIENT)
Dept: RADIOLOGY | Facility: MEDICAL CENTER | Age: 59
End: 2019-07-25
Attending: INTERNAL MEDICINE
Payer: COMMERCIAL

## 2019-07-25 DIAGNOSIS — Z85.43 H/O OVARIAN CANCER: ICD-10-CM

## 2019-07-25 DIAGNOSIS — R10.32 LLQ PAIN: ICD-10-CM

## 2019-07-25 PROCEDURE — 76830 TRANSVAGINAL US NON-OB: CPT

## 2019-07-26 ENCOUNTER — PATIENT MESSAGE (OUTPATIENT)
Dept: MEDICAL GROUP | Facility: MEDICAL CENTER | Age: 59
End: 2019-07-26

## 2019-07-26 DIAGNOSIS — M21.619 BUNION: ICD-10-CM

## 2019-07-29 RX ORDER — RIZATRIPTAN BENZOATE 10 MG/1
TABLET, ORALLY DISINTEGRATING ORAL
Qty: 10 TAB | Refills: 3 | Status: SHIPPED | OUTPATIENT
Start: 2019-07-29

## 2019-07-29 NOTE — PATIENT COMMUNICATION
1. Caller Name: Myrtle Lima                                           Call Back Number: [phone] (home)        Patient approves a detailed voicemail message: yes    2. SPECIFIC Action To Be Taken: Referral pending, please sign.    3. Diagnosis/Clinical Reason for Request: significant bunion on my right foot     4. Specialty & Provider Name/Lab/Imaging Location:N/A    5. Is appointment scheduled for requested order/referral: no    Patient was not informed they will receive a return phone call from the office ONLY if there are any questions before processing their request. Advised to call back if they haven't received a call from the referral department in 5 days.

## 2019-07-29 NOTE — TELEPHONE ENCOUNTER
From: Myrtle Lima  To: Elke Banks M.D.  Sent: 7/26/2019 2:29 PM PDT  Subject: Non-Urgent Medical Question    HI. I've been meaning to ask for a referral to a Podiatrist as I have bunions. 1 significant bunion on my right foot and one in early stages on the left. I'm not sure how this works for referrals/treatment. Thank you!

## 2019-07-31 DIAGNOSIS — R93.89 INCREASED ENDOMETRIAL STRIPE THICKNESS: ICD-10-CM

## 2020-01-02 NOTE — PROGRESS NOTES
CHIEF COMPLIANT:  Myrtle Lima is a 58 y.o. female who presents for annual exam    Pt has GYN provider: no    Recommendations:  Regular exercise at least 4 days a week  Diet: advised balanced diet  Dental exam at least 1-2 times per year  Sunscreen use: advised    Immunizations:  TdaP: 11/2017  Influenza vaccine: Advised  Shingless vaccine: advised shingrix    Colonoscopy: 12/2017, need follow-up after 3 years    GYN  Last PAP:   Remote, normal   Abnormal PAP: no  Last Mammography: 12/2017    Menarche:      Postmenopausal.  C/o vaginal dryness.    HYPOTHYROIDISM  Levothyroxine, 100 mcg, taking daily early in am, before any po intake.  No temperature intolerance. No change in weight, hair/skin quality, BMs.   No tremors, weakness.  No peripheral swelling.  No mood changes.  FH: neg  Reviewed TSH.     Chronic migraine  Stable and controlled.  Onset: since early 20'  The patient reports left temporal headaches, described as: severe in severity , sharp, pounding with nausea, emesis, photophobia or aura, nausea and photophobia, without radiation.  Usual frequency is every 2 weeks for the last 2-3 months  Aura is not present.  Headaches are relieved by rizatriptan, needs prescription for other formulation  Known triggers include: stress, poor sleep.   Previous treatment and prevention include: as above.     Pertinent negatives:  Denies difficulty with speech or swallowing, facial numbness or tingling, focal weakness. Denies sore throat or cough, fever, sinus congestion, ear pain, tooth clenching or grinding.     Family Hx: no known family members with significant headaches  Prior imaging: no     H/o shingles  She has h/o shingles > 10 yrs ago.   C/o intermittent flares, with skin burning,   No recent flareup.  She used Valtrex prn.    Abnormal colonoscopy, colon polyps  3 colon polyps were found on colonoscopy on 12/15/17, requested colonoscopy follow-up after 3 years.    CURRENT MEDICATIONS  Current Outpatient  Emergency Dept/Urgent Care discharge antibiotic (if prescribed): Nitrofurantoin Macrocrystal-Monohydrate (Macrobid) 100 mg PO capsule, 1 capsule (100 mg) by mouth 2 times daily for 3 days  Date of Rx (if applicable):  1/1/20  No changes in treatment per Urine culture protocol.   Prescriptions   Medication Sig Dispense Refill   • levothyroxine (SYNTHROID) 100 MCG Tab Take 1 tablet every day by oral route. 90 Tab 1   • rizatriptan (MAXALT-MLT) 10 MG disintegrating tablet Take 1 Tab by mouth Once PRN for Migraine for up to 1 dose. 10 Tab 3   • valacyclovir (VALTREX) 1 GM Tab Take 1 Tab by mouth 2 times a day. 20 Tab 0     No current facility-administered medications for this visit.      ALLERGIES  Allergies: Penicillins  PAST MEDICAL HISTORY  She  has a past medical history of Hypothyroidism and Migraine. She also has no past medical history of Breast cancer (HCC).  SURGICAL HISTORY  She  has a past surgical history that includes tonsillectomy (); appendectomy (); other orthopedic surgery (); shoulder arthroscopy (Right, ); cholecystectomy (); and endometrial ablation ().  SOCIAL HISTORY  Social History   Substance Use Topics   • Smoking status: Never Smoker   • Smokeless tobacco: Never Used   • Alcohol use 3.0 oz/week     5 Glasses of wine per week     Social History     Social History Narrative   • No narrative on file     FAMILY HISTORY  Family History   Problem Relation Age of Onset   • Cancer Mother 65        ovarian   • Psychiatry Mother         bipolar   • Heart Attack Father         MI,    • Allergies Father    • Heart Disease Father    • No Known Problems Brother    • Heart Disease Paternal Grandfather      Family Status   Relation Status   • Mo    • Fa (Not Specified)   • Bro (Not Specified)   • PGFa (Not Specified)     REVIEW OF SYSTEMS  Constitutional: Denies fever, chills, or sweats  Skin: negative for rash, scaling, itching, pigmentation, hair or nail changes.  Eyes: negative for visual blurring, double vision, eye pain, floaters and discharge from eyes  ENT: negative for tinnitus, vertigo, bleeding gums, dental problem and hoarseness, frequent URI's, sinus trouble, persistent sore throat  Respiratory: negative for persistent cough, hemoptysis,  "dyspnea, recurrent pneumonia or bronchitis, asthma and wheezing  Cardiovascular: negative for palpitations, tachycardia, irregular heart beat, chest pain, or peripheral edema.  Breast: Denies breast tenderness, mass, discharge, changes in size or contour, or abnormal cyclic discomfort.  Gastrointestinal: negative for poor appetite, dysphagia, nausea, heartburn or reflux, abdominal pain, hemorrhoids, constipation or diarrhea. And per HPI.  Genitourinary: negative for dysuria, frequency, hesitancy, incontinence, sexually transmitted disease, abnormal vaginal discharge/bleeding, dysparunia   Musculoskeletal: negative for joint swelling and muscle pain/ soreness  Neuro: negative for involuntary movements or tremor. And per HPI.  Psychiatric: negative for excessive alcohol consumption or illegal drug use, sleep problems, anxiety, depression, sexual difficulties  Hematologic/Lymphatic/Immunologic: negative for anemia, unusual bruising, swollen glands, HIV risk factors  Endocrine: negative for temperature intolerance, polydipsia, polyuria.  And per HPI.    PHYSICAL EXAMINATION:  Blood pressure 142/82, pulse 81, temperature 36.7 °C (98 °F), temperature source Temporal, resp. rate 14, height 1.702 m (5' 7.01\"), weight 99.8 kg (220 lb 0.3 oz), SpO2 97 %, not currently breastfeeding.  Body mass index is 34.45 kg/m².  Wt Readings from Last 4 Encounters:   12/18/18 99.8 kg (220 lb 0.3 oz)   08/15/18 87.6 kg (193 lb 2 oz)   11/03/17 83.5 kg (184 lb)     General appearance:healthy, well developed, well nourished, well-groomed  Psych: alert, no distress, cooperative. Eye contact good, speech goal directed. Affect calm.   Eyes: conjunctivae and sclerae normal, lids and lashes normal, EOMI, PERRLA  ENT: Ears: external ears normal to inspection, canals clear. TMs pearly gray with normal light reflex and anatomic landmarks, Nose/Sinuses: nose shows no deformity, asymmetry, or inflammation, nasal mucosa normal, no sinus tenderness, "   Oropharynx: lips normal without lesions, buccal mucosa normal, gums healthy, teeth intact, non-carious, palate normal, tongue midline and normal  Neck: no asymmetry, masses, adenopathy. Thyroid normal to palpation, carotids without bruits, no jugular venous distention  Lungs: clear to auscultation with good excursion, Normal respiratory rate. Chest symmetric no chest wall tenderness.  Cardiovascular: Regular rate and rhythm, no murmur.  No peripheral edema  Breasts examined seated and supine. No skin changes, peau d'orange or nipple retraction. No axillary or supraclavicular adenopathy. No dominant, discrete, fixed, or suspicious masses found.    Abdomen:  Soft, non-tender, no masses, HSM or palpable renal abnormalities. Bowel sounds normal, no bruits heard. No CVAT.  Musculoskeletal: no evidence of joint effusion, ROM of all joints is normal, no crepitation detected, strength grossly normal UE and LE, proximal and distal motor groups. No deformities present  Lymphatic: None significantly enlarged supraclavicular, axillary, or inguinal  Skin: color normal, vascularity normal, no rashes or suspicious lesions, no evidence of bleeding or bruising  Neuro: speech normal, mental status intact, gait grossly normal, muscle tone normal.  GYN: No suprapubic tenderness.  Perineum and external genitalia normal without rash.   Vagina with without discharge, normal mucosa.  Cervix w/o visible lesions or discharge. No CMT  Uterus normal size, non-tender, no masses,   Adnexa w/o mass or tenderness.   PAP smear was obtained.    LABS    Labs are reviewed and discussed with a patient     Ref. Range 6/11/2018   TSH  0.380 - 5.330 uIU/mL 3.490     ASSESSMENT/PLAN    1. Well female exam with routine gynecological exam  - THINPREP PAP WITH HPV; Future  2. Screening for malignant neoplasm of cervix  - THINPREP PAP WITH HPV; Future    3. Need for vaccination  Ordered flu vaccine, then pt declined.    4. Screening for malignant neoplasm of  breast  - MA-SCREEN MAMMO W/CAD-BILAT; Future    5. Health care maintenance  Advised shingrix    6. Acquired hypothyroidism  Asymptomatic, pending labs, continue current treatment.  - TSH; Future  - FREE THYROXINE; Future  - levothyroxine (SYNTHROID) 100 MCG Tab; Take 1 tablet every day by oral route.  Dispense: 90 Tab; Refill: 3  - TSH; Future  - FREE THYROXINE; Future    7. Chronic migraine  Controlled, continue with current treatment    8. History of shingles  Continue Valtrex as needed, no current symptoms.    9. Abnormal colonoscopy  10. Polyp of colon, unspecified part of colon, unspecified type  Requested colonoscopy follow-up after 3 years, in December 2020.    Smoking Counseling: Nonsmoker    Next office visit is due in  1 year and prn    All questions are answered.     Please note that this dictation was created using voice recognition software. Despite all efforts, some minor grammar mistakes are possible.

## 2020-02-21 DIAGNOSIS — E03.9 ACQUIRED HYPOTHYROIDISM: ICD-10-CM

## 2020-02-21 RX ORDER — LEVOTHYROXINE SODIUM 0.1 MG/1
TABLET ORAL
Qty: 90 TAB | Refills: 0 | Status: SHIPPED | OUTPATIENT
Start: 2020-02-21 | End: 2020-05-28 | Stop reason: SDUPTHER

## 2020-03-24 ENCOUNTER — TELEPHONE (OUTPATIENT)
Dept: PHYSICAL THERAPY | Facility: REHABILITATION | Age: 60
End: 2020-03-24

## 2020-03-24 NOTE — OP THERAPY DISCHARGE SUMMARY
Outpatient Physical Therapy  DISCHARGE SUMMARY NOTE      [facility] [address]  [address]  [place] [address]  Phone:  [phone]  Fax:  [phone]    Date of Visit: 03/24/2020    Patient: Myrtle Lima  YOB: 1960  MRN: [id number]     Referring Provider: No referring provider defined for this encounter.   Referring Diagnosis No admission diagnoses are documented for this encounter.     Physical Therapy Occurrence Codes    Date of onset of impairment:  3/26/19   Date physical therapy care plan established or reviewed:  5/15/19   Date physical therapy treatment started:  5/15/19          Functional Assessment Used        Your patient is being discharged from Physical Therapy with the following comments:   · other    Comments:  Patient was seen for Physical Therapy services by Jeffery Staley PT.  The therapist is no longer with the clinic.  This patient is being discharged on his behalf.       Emerald Johnson PT, DPT    Date: 3/24/2020

## 2020-05-28 DIAGNOSIS — E03.9 ACQUIRED HYPOTHYROIDISM: ICD-10-CM

## 2020-05-28 RX ORDER — LEVOTHYROXINE SODIUM 0.1 MG/1
TABLET ORAL
Qty: 30 TAB | Refills: 0 | Status: SHIPPED | OUTPATIENT
Start: 2020-05-28

## 2022-04-15 ENCOUNTER — OFFICE VISIT (OUTPATIENT)
Dept: URBAN - METROPOLITAN AREA CLINIC 89 | Facility: CLINIC | Age: 62
End: 2022-04-15
Payer: COMMERCIAL

## 2022-04-15 DIAGNOSIS — H11.153 PINGUECULA, BILATERAL: ICD-10-CM

## 2022-04-15 DIAGNOSIS — H35.3132 NONEXUDATIVE MACULAR DEGENERATION, INTERMEDIATE DRY STAGE, BILATERAL: ICD-10-CM

## 2022-04-15 DIAGNOSIS — H25.13 AGE-RELATED NUCLEAR CATARACT, BILATERAL: Primary | ICD-10-CM

## 2022-04-15 PROCEDURE — 92250 FUNDUS PHOTOGRAPHY W/I&R: CPT | Performed by: OPTOMETRIST

## 2022-04-15 PROCEDURE — 92134 CPTRZ OPH DX IMG PST SGM RTA: CPT | Performed by: OPTOMETRIST

## 2022-04-15 PROCEDURE — 99204 OFFICE O/P NEW MOD 45 MIN: CPT | Performed by: OPTOMETRIST

## 2022-04-15 NOTE — IMPRESSION/PLAN
Impression: Age-related nuclear cataract, bilateral: H25.13. Plan: Discussed with the patient that the presence of Macular Degeneration may limit vision following cataract surgery.

## 2022-04-15 NOTE — IMPRESSION/PLAN
Impression: Nonexudative macular degeneration, intermediate dry stage, bilateral: H35.6841. Plan: Exam findings consistent with dry type Age Related Macular Degeneration. Patient is advised to continue AREDS2 vitamin supplementation. A handout with an [employer] grid was provided to the patient. Baseline photos and MOCT performed today.   Patient states that two years ago at her last eye exam it was listed as mild; today's findings are consistent with moderate dry AMD.

## 2022-04-15 NOTE — IMPRESSION/PLAN
Impression: Age-related nuclear cataract, bilateral: H25.13. Plan: Patient has complaints consistent with visually significant cataracts. Will proceed with cataract surgery left eye first.  Discussed the risks, benefits, and alternatives of cataract surgery. Given that we almost never use retrobulbar anesthesia, there is typically no need to stop any anticoagulant medications when a patient gets cataract surgery with us. In most cataract surgeries performed by us we place an antibiotic and steroid at the time of surgery so most likely will not need to use any prescription post-op drops. The patient stated a full understanding and a desire to proceed with the procedure. Biometry ordered for intraocular lens selection. Advised against driving until complete. Reviewed the increased risk of retinal detachment after cataract surgery and reviewed retinal detachment and general warnings and to contact us immediately should any of those develop. 

CE OU, OS First.  MOCT, glare testing and refraction performed

## 2022-05-31 ENCOUNTER — TESTING ONLY (OUTPATIENT)
Dept: URBAN - METROPOLITAN AREA CLINIC 88 | Facility: CLINIC | Age: 62
End: 2022-05-31
Payer: COMMERCIAL

## 2022-05-31 DIAGNOSIS — H25.13 AGE-RELATED NUCLEAR CATARACT, BILATERAL: Primary | ICD-10-CM

## 2022-06-07 ENCOUNTER — SURGERY (OUTPATIENT)
Dept: URBAN - METROPOLITAN AREA SURGERY 56 | Facility: SURGERY | Age: 62
End: 2022-06-07
Payer: COMMERCIAL

## 2022-06-08 ENCOUNTER — POST-OPERATIVE VISIT (OUTPATIENT)
Dept: URBAN - METROPOLITAN AREA CLINIC 89 | Facility: CLINIC | Age: 62
End: 2022-06-08
Payer: COMMERCIAL

## 2022-06-08 DIAGNOSIS — Z48.810 ENCOUNTER FOR SURGICAL AFTERCARE FOLLOWING SURGERY ON A SENSE ORGAN: Primary | ICD-10-CM

## 2022-06-08 PROCEDURE — 99024 POSTOP FOLLOW-UP VISIT: CPT | Performed by: OPTOMETRIST

## 2022-06-08 ASSESSMENT — INTRAOCULAR PRESSURE
OS: 17
OD: 17

## 2022-06-08 NOTE — IMPRESSION/PLAN
Impression:  Encounter for surgical aftercare following surgery on a sense organ  Z48.810. Excellent post op course   Post operative instructions reviewed - Plan: S/P Cataract extraction left eye with placement of intraocular Dexycu and moxifloxacin intracamerally - post-op day #1 - Advised patient that likely will see floaters for 1-2 weeks. Advised no heavy lifting or strenuous activity for at least one week and no swimming for at least three weeks. Use eye shield at night for one week. Patient advised to call immediately for any problems including, but not limited to, pain, redness, increase in floaters, flashing lights, changes in peripheral vision, decreased vision, and/or any other problems or concerns. Patient stated an understanding of all the instructions. Follow-up in approximately one week / prn.

## 2022-06-15 ENCOUNTER — POST-OPERATIVE VISIT (OUTPATIENT)
Dept: URBAN - METROPOLITAN AREA CLINIC 89 | Facility: CLINIC | Age: 62
End: 2022-06-15
Payer: COMMERCIAL

## 2022-06-15 DIAGNOSIS — Z48.810 ENCOUNTER FOR SURGICAL AFTERCARE FOLLOWING SURGERY ON A SENSE ORGAN: Primary | ICD-10-CM

## 2022-06-15 PROCEDURE — 99024 POSTOP FOLLOW-UP VISIT: CPT | Performed by: OPTOMETRIST

## 2022-06-15 RX ORDER — PREDNISOLONE ACETATE 10 MG/ML
1 % SUSPENSION/ DROPS OPHTHALMIC
Qty: 5 | Refills: 0 | Status: ACTIVE
Start: 2022-06-15

## 2022-06-15 ASSESSMENT — INTRAOCULAR PRESSURE
OS: 10
OD: 10

## 2022-06-15 NOTE — IMPRESSION/PLAN
Impression:  Encounter for surgical aftercare following surgery on a sense organ  Z48.810. Excellent post op course   Post operative instructions reviewed - Plan: 1 week - s/p cataract surgery left eye with placement of intraocular Dexycu and moxifloxacin intracamerally. Healing well. Advised patient to avoid swimming for at least 2 more weeks. Advised to call immediately for any problems or concerns including, but not limited to, pain, redness, changes in peripheral vision and/or decreased vision. The patient stated an understanding of the above. Due to postoperative iridocyclitis, start prednisolone acetate 1% QID OS x 1 week. RTC as scheduled.

## 2022-06-22 ENCOUNTER — POST-OPERATIVE VISIT (OUTPATIENT)
Dept: URBAN - METROPOLITAN AREA CLINIC 88 | Facility: CLINIC | Age: 62
End: 2022-06-22
Payer: COMMERCIAL

## 2022-06-22 DIAGNOSIS — Z96.1 PRESENCE OF INTRAOCULAR LENS: Primary | ICD-10-CM

## 2022-06-22 RX ORDER — PREDNISOLONE ACETATE 10 MG/ML
1 % SUSPENSION/ DROPS OPHTHALMIC
Qty: 10 | Refills: 0 | Status: ACTIVE
Start: 2022-06-22

## 2022-06-22 ASSESSMENT — INTRAOCULAR PRESSURE
OS: 18
OD: 21

## 2022-06-22 NOTE — IMPRESSION/PLAN
Impression: S/P Cataract Extraction by phacoemulsification with IOL placement; ORA OD - 1 Day. Presence of intraocular lens  Z96.1. Plan: Pt had Dexycu and Moxifloxacin during surgery, no surgical drops needed at this time OD. Continue Prednisolone OS TID X 1 week, BID X 1 week, QD X 1 week, then D/C. Discussed PCO as most likely cause of blurred/doubled images OS. Will contineu to monitor and discuss YAG once cornea and iritis have stabilized if vision still the same or worse. RTC immediately if increase in pain, redness or decrease in vision occurs.

## 2022-06-29 ENCOUNTER — POST-OPERATIVE VISIT (OUTPATIENT)
Dept: URBAN - METROPOLITAN AREA CLINIC 89 | Facility: CLINIC | Age: 62
End: 2022-06-29
Payer: COMMERCIAL

## 2022-06-29 DIAGNOSIS — Z96.1 PRESENCE OF INTRAOCULAR LENS: Primary | ICD-10-CM

## 2022-06-29 PROCEDURE — 99024 POSTOP FOLLOW-UP VISIT: CPT | Performed by: OPTOMETRIST

## 2022-06-29 ASSESSMENT — INTRAOCULAR PRESSURE
OS: 17
OD: 10

## 2022-06-29 NOTE — IMPRESSION/PLAN
Impression: S/P CE/Standard IOL OD - 8 Days. Presence of intraocular lens  Z96.1. Excellent post op course   Post operative instructions reviewed - Plan: 1 week - s/p cataract surgery right eye with placement of intraocular Dexycu and moxifloxacin intracamerally. Healing well. Advised patient to avoid swimming for at least 2 more weeks. Advised to call immediately for any problems or concerns including, but not limited to, pain, redness, changes in peripheral vision and/or decreased vision. The patient stated an understanding of the above. 

RTC 3wk dilation OU, refract OS, consider YAG OS

## 2022-07-06 ENCOUNTER — POST-OPERATIVE VISIT (OUTPATIENT)
Dept: URBAN - METROPOLITAN AREA CLINIC 89 | Facility: CLINIC | Age: 62
End: 2022-07-06
Payer: COMMERCIAL

## 2022-07-06 DIAGNOSIS — Z96.1 PRESENCE OF INTRAOCULAR LENS: ICD-10-CM

## 2022-07-06 DIAGNOSIS — H43.811 VITREOUS DEGENERATION, RIGHT EYE: Primary | ICD-10-CM

## 2022-07-06 PROCEDURE — 92012 INTRM OPH EXAM EST PATIENT: CPT | Performed by: OPTOMETRIST

## 2022-07-06 ASSESSMENT — INTRAOCULAR PRESSURE
OD: 15
OS: 17

## 2022-07-06 NOTE — IMPRESSION/PLAN
Impression: Vitreous degeneration, right eye: H43.811. Plan: Traumatic PVD caused by dog poking patient in the eye. Discussion with patient regarding posterior vitreous detachment and that while it is generally a benign condition, it can be bothersome. Patient advised of signs/symptoms associated with retinal tear/break/detachment and to RTC ASAP.

## 2022-07-19 ENCOUNTER — OFFICE VISIT (OUTPATIENT)
Dept: URBAN - METROPOLITAN AREA CLINIC 89 | Facility: CLINIC | Age: 62
End: 2022-07-19
Payer: COMMERCIAL

## 2022-07-19 DIAGNOSIS — H11.153 PINGUECULA, BILATERAL: ICD-10-CM

## 2022-07-19 DIAGNOSIS — Z48.810 ENCOUNTER FOR SURGICAL AFTERCARE FOLLOWING SURGERY ON A SENSE ORGAN: Primary | ICD-10-CM

## 2022-07-19 DIAGNOSIS — H43.813 VITREOUS DEGENERATION, BILATERAL: ICD-10-CM

## 2022-07-19 DIAGNOSIS — H35.3132 NONEXUDATIVE MACULAR DEGENERATION, INTERMEDIATE DRY STAGE, BILATERAL: ICD-10-CM

## 2022-07-19 PROCEDURE — 99024 POSTOP FOLLOW-UP VISIT: CPT | Performed by: OPTOMETRIST

## 2022-07-19 ASSESSMENT — INTRAOCULAR PRESSURE
OD: 15
OS: 15

## 2022-07-19 NOTE — IMPRESSION/PLAN
Impression: Encounter for surgical aftercare following surgery on a sense organ: Z48.810. Plan: Patient complains of double vision left eye only. AR today is -0.50-0.50x007 and patient was immediately 20/20. Plan to RTC 6wk fu and repeat refraction. If stable, then refer to Dr Cinthia Kramer for [place] [id number] touchup.

## 2022-07-19 NOTE — IMPRESSION/PLAN
Impression: Vitreous degeneration, bilateral: H43.813. Plan: Discussion with patient regarding posterior vitreous detachment and that while it is generally a benign condition, it can be bothersome. Patient advised of signs/symptoms associated with retinal tear/break/detachment and to [facility] ASAP.

## 2022-08-30 ENCOUNTER — OFFICE VISIT (OUTPATIENT)
Dept: URBAN - METROPOLITAN AREA CLINIC 89 | Facility: CLINIC | Age: 62
End: 2022-08-30
Payer: COMMERCIAL

## 2022-08-30 DIAGNOSIS — Z96.1 PRESENCE OF INTRAOCULAR LENS: Primary | ICD-10-CM

## 2022-08-30 PROCEDURE — 92015 DETERMINE REFRACTIVE STATE: CPT | Performed by: OPTOMETRIST

## 2022-08-30 PROCEDURE — 99024 POSTOP FOLLOW-UP VISIT: CPT | Performed by: OPTOMETRIST

## 2022-08-30 ASSESSMENT — INTRAOCULAR PRESSURE
OD: 12
OS: 12

## 2022-08-30 NOTE — IMPRESSION/PLAN
Impression: Presence of intraocular lens: Z96.1. Plan: Discussion with patient regarding residual myopia s/p toric IOL.   

Schedule PRK Touch Up evaluation with Lenore Azevedo MD

## 2022-09-07 ENCOUNTER — OFFICE VISIT (OUTPATIENT)
Dept: URBAN - METROPOLITAN AREA CLINIC 4 | Facility: CLINIC | Age: 62
End: 2022-09-07
Payer: COMMERCIAL

## 2022-09-07 DIAGNOSIS — H53.8 OTHER VISUAL DISTURBANCES: Primary | ICD-10-CM

## 2022-09-07 ASSESSMENT — VISUAL ACUITY: OS: 20/20

## 2022-09-07 ASSESSMENT — KERATOMETRY
OD: 43.38
OS: 43.88

## 2022-09-07 ASSESSMENT — INTRAOCULAR PRESSURE
OD: 11
OS: 11

## 2022-10-05 ENCOUNTER — POST-OPERATIVE VISIT (OUTPATIENT)
Dept: URBAN - METROPOLITAN AREA CLINIC 89 | Facility: CLINIC | Age: 62
End: 2022-10-05
Payer: COMMERCIAL

## 2022-10-05 DIAGNOSIS — Z48.810 ENCOUNTER FOR SURGICAL AFTERCARE FOLLOWING SURGERY ON A SENSE ORGAN: Primary | ICD-10-CM

## 2022-10-05 PROCEDURE — 99024 POSTOP FOLLOW-UP VISIT: CPT | Performed by: OPTOMETRIST

## 2022-10-05 NOTE — IMPRESSION/PLAN
Impression: S/P PO PRK OU over IOL OU OU - 5 Days. Encounter for surgical aftercare following surgery on a sense organ  Z48.810. Plan: Continue moxifloxacin QID and prednisolone BID OS. Keep BCL in place. RTC 5 day fu and BCL removal.  Doctor to remove BCL.

## 2022-10-10 ENCOUNTER — POST-OPERATIVE VISIT (OUTPATIENT)
Dept: URBAN - METROPOLITAN AREA CLINIC 89 | Facility: CLINIC | Age: 62
End: 2022-10-10
Payer: COMMERCIAL

## 2022-10-10 DIAGNOSIS — Z48.810 ENCOUNTER FOR SURGICAL AFTERCARE FOLLOWING SURGERY ON A SENSE ORGAN: Primary | ICD-10-CM

## 2022-10-10 PROCEDURE — 99024 POSTOP FOLLOW-UP VISIT: CPT | Performed by: OPTOMETRIST

## 2022-10-10 NOTE — IMPRESSION/PLAN
Impression: S/P PRK Over IOL OS - 10 Days. Encounter for surgical aftercare following surgery on a sense organ  Z48.810. Plan: Removed BCL. Stop moxifloxacin. Start steroid taper:  QD x 1 week.   RTC 1m PO

## 2022-11-29 ENCOUNTER — OFFICE VISIT (OUTPATIENT)
Dept: URBAN - METROPOLITAN AREA CLINIC 89 | Facility: CLINIC | Age: 62
End: 2022-11-29
Payer: COMMERCIAL

## 2022-11-29 DIAGNOSIS — H00.014 HORDEOLUM, LEFT UPPER LID: Primary | ICD-10-CM

## 2022-11-29 PROCEDURE — 99213 OFFICE O/P EST LOW 20 MIN: CPT | Performed by: OPTOMETRIST

## 2022-11-29 RX ORDER — NEOMYCIN SULFATE, POLYMYXIN B SULFATE AND DEXAMETHASONE 3.5; 10000; 1 MG/ML; [USP'U]/ML; MG/ML
SUSPENSION/ DROPS OPHTHALMIC
Qty: 10 | Refills: 0 | Status: ACTIVE
Start: 2022-11-29

## 2022-11-29 RX ORDER — CEPHALEXIN 500 MG/1
500 MG CAPSULE ORAL
Qty: 40 | Refills: 0 | Status: ACTIVE
Start: 2022-11-29

## 2022-11-29 ASSESSMENT — INTRAOCULAR PRESSURE
OS: 14
OD: 13

## 2022-11-29 NOTE — IMPRESSION/PLAN
Impression: Hordeolum, left upper lid: H00.014. Plan: Stop Bactrim. Spoke with patient's PCP, and they cleared patient for cephalexin due to history of anaphlyaxis with PCN. Start cephalexin 500mg PO QID x 10d. Continue adam/poly/dex soln QID/ Warm compresses QID. Handout given. RTC 7 day fu.

## 2022-12-06 ENCOUNTER — OFFICE VISIT (OUTPATIENT)
Dept: URBAN - METROPOLITAN AREA CLINIC 89 | Facility: CLINIC | Age: 62
End: 2022-12-06
Payer: COMMERCIAL

## 2022-12-06 DIAGNOSIS — H00.14 CHALAZION LEFT UPPER LID: Primary | ICD-10-CM

## 2022-12-06 PROCEDURE — 67801 REMOVE EYELID LESIONS: CPT | Performed by: OPTOMETRIST

## 2022-12-06 RX ORDER — NEOMYCIN AND POLYMYXIN B SULFATES, BACITRACIN ZINC AND HYDROCORTISONE ACETATE 3.5; 10000; 400; 1 MG/G; [USP'U]/G; [USP'U]/G; MG/G
OINTMENT OPHTHALMIC
Qty: 3.5 | Refills: 0 | Status: ACTIVE
Start: 2022-12-06

## 2022-12-06 RX ORDER — CEPHALEXIN 500 MG/1
500 MG CAPSULE ORAL
Qty: 40 | Refills: 0 | Status: ACTIVE
Start: 2022-12-06

## 2023-06-06 ENCOUNTER — OFFICE VISIT (OUTPATIENT)
Dept: URBAN - METROPOLITAN AREA CLINIC 89 | Facility: CLINIC | Age: 63
End: 2023-06-06
Payer: COMMERCIAL

## 2023-06-06 DIAGNOSIS — D48.7 NEOPLASM OF UNCERTAIN BEHAVIOR OF OTHER SPECIFIED SITES: Primary | ICD-10-CM

## 2023-06-06 PROCEDURE — 99213 OFFICE O/P EST LOW 20 MIN: CPT | Performed by: OPTOMETRIST

## 2023-06-06 ASSESSMENT — INTRAOCULAR PRESSURE
OD: 11
OS: 10

## 2023-06-06 NOTE — IMPRESSION/PLAN
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 Plan: Patient has a scaling, scabbing lesion of the upper left lid that has not resolved for 6 months. Given recurrence and appearance, cannot ruleout BCC or SCC. Discussed concerns with patient and will refer to oculoplastics for further evaluation and management.

## 2024-02-27 ENCOUNTER — APPOINTMENT (RX ONLY)
Dept: URBAN - METROPOLITAN AREA CLINIC 153 | Facility: CLINIC | Age: 64
Setting detail: DERMATOLOGY
End: 2024-02-27

## 2024-02-27 DIAGNOSIS — L57.8 OTHER SKIN CHANGES DUE TO CHRONIC EXPOSURE TO NONIONIZING RADIATION: ICD-10-CM

## 2024-02-27 DIAGNOSIS — L71.8 OTHER ROSACEA: ICD-10-CM

## 2024-02-27 PROBLEM — D48.5 NEOPLASM OF UNCERTAIN BEHAVIOR OF SKIN: Status: ACTIVE | Noted: 2024-02-27

## 2024-02-27 PROCEDURE — 11102 TANGNTL BX SKIN SINGLE LES: CPT

## 2024-02-27 PROCEDURE — 99203 OFFICE O/P NEW LOW 30 MIN: CPT | Mod: 25

## 2024-02-27 PROCEDURE — ? COUNSELING

## 2024-02-27 PROCEDURE — ? PRESCRIPTION

## 2024-02-27 PROCEDURE — ? BIOPSY BY SHAVE METHOD

## 2024-02-27 RX ORDER — TRETIONIN 1 MG/G
CREAM TOPICAL
Qty: 60 | Refills: 0 | Status: ERX | COMMUNITY
Start: 2024-02-27

## 2024-02-27 RX ADMIN — TRETIONIN: 1 CREAM TOPICAL at 00:00

## 2024-02-27 ASSESSMENT — LOCATION DETAILED DESCRIPTION DERM
LOCATION DETAILED: RIGHT INFERIOR CENTRAL MALAR CHEEK
LOCATION DETAILED: LEFT INFERIOR MEDIAL MALAR CHEEK
LOCATION DETAILED: RIGHT CENTRAL MALAR CHEEK
LOCATION DETAILED: LEFT CENTRAL MALAR CHEEK

## 2024-02-27 ASSESSMENT — LOCATION SIMPLE DESCRIPTION DERM
LOCATION SIMPLE: LEFT CHEEK
LOCATION SIMPLE: RIGHT CHEEK

## 2024-02-27 ASSESSMENT — LOCATION ZONE DERM: LOCATION ZONE: FACE

## 2024-03-05 ENCOUNTER — RX ONLY (OUTPATIENT)
Age: 64
Setting detail: RX ONLY
End: 2024-03-05

## 2024-03-05 RX ORDER — IMIQUIMOD 12.5 MG/.25G
CREAM TOPICAL
Qty: 6 | Refills: 0 | Status: ERX | COMMUNITY
Start: 2024-03-05